# Patient Record
Sex: FEMALE | Race: WHITE | NOT HISPANIC OR LATINO
[De-identification: names, ages, dates, MRNs, and addresses within clinical notes are randomized per-mention and may not be internally consistent; named-entity substitution may affect disease eponyms.]

---

## 2020-11-14 ENCOUNTER — APPOINTMENT (OUTPATIENT)
Dept: ULTRASOUND IMAGING | Facility: CLINIC | Age: 65
End: 2020-11-14

## 2020-12-27 ENCOUNTER — TRANSCRIPTION ENCOUNTER (OUTPATIENT)
Age: 65
End: 2020-12-27

## 2021-04-19 ENCOUNTER — EMERGENCY (EMERGENCY)
Facility: HOSPITAL | Age: 66
LOS: 1 days | Discharge: HOME | End: 2021-04-19
Attending: EMERGENCY MEDICINE | Admitting: EMERGENCY MEDICINE
Payer: COMMERCIAL

## 2021-04-19 VITALS
RESPIRATION RATE: 18 BRPM | SYSTOLIC BLOOD PRESSURE: 105 MMHG | DIASTOLIC BLOOD PRESSURE: 65 MMHG | OXYGEN SATURATION: 92 % | HEART RATE: 85 BPM | TEMPERATURE: 98 F | HEIGHT: 66 IN

## 2021-04-19 VITALS
DIASTOLIC BLOOD PRESSURE: 92 MMHG | HEART RATE: 82 BPM | OXYGEN SATURATION: 94 % | RESPIRATION RATE: 17 BRPM | TEMPERATURE: 98 F | SYSTOLIC BLOOD PRESSURE: 107 MMHG

## 2021-04-19 PROCEDURE — 73562 X-RAY EXAM OF KNEE 3: CPT

## 2021-04-19 PROCEDURE — 96372 THER/PROPH/DIAG INJ SC/IM: CPT

## 2021-04-19 PROCEDURE — 99283 EMERGENCY DEPT VISIT LOW MDM: CPT | Mod: 25

## 2021-04-19 PROCEDURE — 99284 EMERGENCY DEPT VISIT MOD MDM: CPT

## 2021-04-19 PROCEDURE — 73562 X-RAY EXAM OF KNEE 3: CPT | Mod: 26,LT

## 2021-04-19 RX ORDER — KETOROLAC TROMETHAMINE 30 MG/ML
30 SYRINGE (ML) INJECTION ONCE
Refills: 0 | Status: DISCONTINUED | OUTPATIENT
Start: 2021-04-19 | End: 2021-04-19

## 2021-04-19 RX ORDER — OXYCODONE AND ACETAMINOPHEN 5; 325 MG/1; MG/1
2 TABLET ORAL ONCE
Refills: 0 | Status: DISCONTINUED | OUTPATIENT
Start: 2021-04-19 | End: 2021-04-19

## 2021-04-19 RX ORDER — METFORMIN HYDROCHLORIDE 850 MG/1
0 TABLET ORAL
Qty: 0 | Refills: 0 | DISCHARGE

## 2021-04-19 RX ORDER — OLMESARTAN MEDOXOMIL 5 MG/1
1 TABLET, FILM COATED ORAL
Qty: 0 | Refills: 0 | DISCHARGE

## 2021-04-19 RX ORDER — LEVOTHYROXINE SODIUM 125 MCG
1 TABLET ORAL
Qty: 0 | Refills: 0 | DISCHARGE

## 2021-04-19 RX ORDER — ERGOCALCIFEROL 1.25 MG/1
0 CAPSULE ORAL
Qty: 0 | Refills: 0 | DISCHARGE

## 2021-04-19 RX ORDER — DIAZEPAM 5 MG
5 TABLET ORAL ONCE
Refills: 0 | Status: DISCONTINUED | OUTPATIENT
Start: 2021-04-19 | End: 2021-04-19

## 2021-04-19 RX ADMIN — Medication 30 MILLIGRAM(S): at 20:40

## 2021-04-19 RX ADMIN — OXYCODONE AND ACETAMINOPHEN 2 TABLET(S): 5; 325 TABLET ORAL at 21:35

## 2021-04-19 RX ADMIN — Medication 5 MILLIGRAM(S): at 20:39

## 2021-04-19 NOTE — ED PROVIDER NOTE - PATIENT PORTAL LINK FT
You can access the FollowMyHealth Patient Portal offered by [facility] by registering at the following website: [email or web address]. By joining [employer]’s FollowMyHealth portal, you will also be able to view your health information using other applications (apps) compatible with our system.

## 2021-04-19 NOTE — ED PROVIDER NOTE - OBJECTIVE STATEMENT
64 y/o F with PMH of HTN and preDM on metformin presents to the ED with c/o left posterior knee pain x this afternoon. pt was sitting in her recliner, she went to put the leg lift part down and felt a tear followed by sudden pain in the knee. She denies direct trauma, paresthesia, leg swelling, f/c, or all other complaints

## 2021-04-19 NOTE — ED ADULT TRIAGE NOTE - CHIEF COMPLAINT QUOTE
pain to posterior L knee while getting up from chair   at rest, feels like pins and needles/ difficulty bearing weight

## 2021-04-19 NOTE — ED PROVIDER NOTE - CLINICAL SUMMARY MEDICAL DECISION MAKING FREE TEXT BOX
66 y/o F with PMH of HTN and preDM on metformin presents to the ED with c/o left posterior knee pain x this afternoon. pt was sitting in her recliner, she went to put the leg lift part down and felt a tear followed by sudden pain in the knee. She denies direct trauma, paresthesia, leg swelling, f/c, or all other complaints. PE as noted above. possible muscle spasm. XR images reviewed with Dr. Mayito SAINI. pain meds given in the ED. pt stable for dc with ortho f/u Jorge: 64 y/o F with PMH of HTN and preDM on metformin presents to the ED with c/o left posterior knee pain x this afternoon. pt was sitting in her recliner, she went to put the leg lift part down and felt a tear followed by sudden pain in the knee. She denies direct trauma, paresthesia, leg swelling, f/c, or all other complaints. PE as noted above. possible muscle spasm. XR images reviewed with Dr. Patel- YENY. pain meds given in the ED. pt stable for dc with ortho f/u

## 2021-04-19 NOTE — ED PROVIDER NOTE - CARE PROVIDERS DIRECT ADDRESSES
[email or web address]

## 2021-04-19 NOTE — ED PROVIDER NOTE - MUSCULOSKELETAL MINIMAL EXAM
+TTP over the left knee + mild TTP over the left knee posterior knee. FROM of knee. no obvious deformity. No calf TTP noted. no swelling on exam

## 2021-04-19 NOTE — ED PROVIDER NOTE - CARE PROVIDER_API CALL
Ted Austin)  Orthopaedic Sports Medicine; Orthopaedic Surgery  [address] [address]  Phone: [phone]  Fax: [phone]  Follow Up Time:

## 2021-04-19 NOTE — ED PROVIDER NOTE - NSFOLLOWUPINSTRUCTIONS_ED_ALL_ED_FT
Follow up with the orthopedic specialist within 2-3 days.     Rest, Ice 15 min on/ 15 min off, Elevate and keep compression of affected area.     Take the prescribed medication as directed. Keep your knee immobilizer in place.     Stay hydrated    Return to the ER if your symptoms worsen or for any other medical emergencies  *****************      Knee Pain    WHAT YOU NEED TO KNOW:    Knee pain may start suddenly, or it may be a long-term problem. You may have pain on the side, front, or back of your knee. You may have knee stiffness and swelling. You may hear popping sounds or feel like your knee is giving way or locking up as you walk. You may feel pain when you sit, stand, walk, or climb up and down stairs. Knee pain can be caused by conditions such as obesity, inflammation, or strains or tears in ligaments or tendons.     DISCHARGE INSTRUCTIONS:    Return to the emergency department if:   •Your pain is worse, even after treatment.       •You cannot bend or straighten your leg completely.       •The swelling around your knee does not go down even with treatment.      •Your knee is painful and hot to the touch.       Contact your healthcare provider if:   •You have questions or concerns about your condition or care.           Medicines: You may need any of the following:   •NSAIDs help decrease swelling and pain or fever. This medicine is available with or without a doctor's order. NSAIDs can cause stomach bleeding or kidney problems in certain people. If you take blood thinner medicine, always ask your healthcare provider if NSAIDs are safe for you. Always read the medicine label and follow directions.      •Acetaminophen decreases pain and fever. It is available without a doctor's order. Ask how much to take and how often to take it. Follow directions. Read the labels of all other medicines you are using to see if they also contain acetaminophen, or ask your doctor or pharmacist. Acetaminophen can cause liver damage if not taken correctly. Do not use more than 4 grams (4,000 milligrams) total of acetaminophen in one day.       •Prescription pain medicine may be given. Ask your healthcare provider how to take this medicine safely. Some prescription pain medicines contain acetaminophen. Do not take other medicines that contain acetaminophen without talking to your healthcare provider. Too much acetaminophen may cause liver damage. Prescription pain medicine may cause constipation. Ask your healthcare provider how to prevent or treat constipation.       •Take your medicine as directed. Contact your healthcare provider if you think your medicine is not helping or if you have side effects. Tell him or her if you are allergic to any medicine. Keep a list of the medicines, vitamins, and herbs you take. Include the amounts, and when and why you take them. Bring the list or the pill bottles to follow-up visits. Carry your medicine list with you in case of an emergency.      What you can do to manage your symptoms:   •Rest your knee so it can heal. Limit activities that increase your pain. Do low-impact exercises, such as walking or swimming.       •Apply ice to help reduce swelling and pain. Use an ice pack, or put crushed ice in a plastic bag. Cover it with a towel before you apply it to your knee. Apply ice for 15 to 20 minutes every hour, or as directed.      •Apply compression to help reduce swelling. Use a brace or bandage only as directed.      •Elevate your knee to help decrease pain and swelling. Elevate your knee while you are sitting or lying down. Prop your leg on pillows to keep your knee above the level of your heart.      •Prevent your knee from moving as directed. Your healthcare provider may put on a cast or splint. You may need to wear a leg brace to stabilize your knee. A leg brace can be adjusted to increase your range of motion as your knee heals.  Hinged Knee Braces            What you can do to prevent knee pain:   •Maintain a healthy weight. Extra weight increases your risk for knee pain. Ask your healthcare provider how much you should weigh. He or she can help you create a safe weight loss plan if you need to lose weight.      •Exercise or train properly. Use the correct equipment for sports. Wear shoes that provide good support. Check your posture often as you exercise, play sports, or train for an event. This can help prevent stress and strain on your knees. Rest between sessions so you do not overwork your knees.      Follow up with your healthcare provider within 24 hours or as directed: You may need follow-up treatments, such as steroid injections to decrease pain. Write down your questions so you remember to ask them during your visits.

## 2022-06-17 NOTE — ED ADULT NURSE NOTE - NS ED NOTE ABUSE SUSPICION NEGLECT YN
Cyclosporine Pregnancy And Lactation Text: This medication is Pregnancy Category C and it isn't know if it is safe during pregnancy. This medication is excreted in breast milk. No

## 2023-11-03 PROBLEM — I10 ESSENTIAL (PRIMARY) HYPERTENSION: Chronic | Status: ACTIVE | Noted: 2021-04-19

## 2023-11-03 PROBLEM — E03.9 HYPOTHYROIDISM, UNSPECIFIED: Chronic | Status: ACTIVE | Noted: 2021-04-19

## 2024-01-17 ENCOUNTER — NON-APPOINTMENT (OUTPATIENT)
Age: 69
End: 2024-01-17

## 2024-01-17 RX ORDER — MECLIZINE HYDROCHLORIDE 12.5 MG/1
12.5 TABLET ORAL DAILY
Refills: 0 | Status: ACTIVE | COMMUNITY

## 2024-01-17 RX ORDER — CLOTRIMAZOLE AND BETAMETHASONE DIPROPIONATE 10; .5 MG/G; MG/G
1-0.05 CREAM TOPICAL
Refills: 0 | Status: ACTIVE | COMMUNITY

## 2024-01-17 RX ORDER — ALPRAZOLAM 0.25 MG/1
0.25 TABLET ORAL
Refills: 0 | Status: ACTIVE | COMMUNITY

## 2024-02-05 ENCOUNTER — RX RENEWAL (OUTPATIENT)
Age: 69
End: 2024-02-05

## 2024-02-27 ENCOUNTER — APPOINTMENT (OUTPATIENT)
Dept: INTERNAL MEDICINE | Facility: CLINIC | Age: 69
End: 2024-02-27
Payer: MEDICARE

## 2024-02-27 VITALS
WEIGHT: 293 LBS | TEMPERATURE: 98.2 F | HEIGHT: 64 IN | OXYGEN SATURATION: 96 % | BODY MASS INDEX: 50.02 KG/M2 | SYSTOLIC BLOOD PRESSURE: 109 MMHG | HEART RATE: 88 BPM | DIASTOLIC BLOOD PRESSURE: 69 MMHG

## 2024-02-27 DIAGNOSIS — M17.12 UNILATERAL PRIMARY OSTEOARTHRITIS, LEFT KNEE: ICD-10-CM

## 2024-02-27 DIAGNOSIS — Z00.00 ENCOUNTER FOR GENERAL ADULT MEDICAL EXAMINATION W/OUT ABNORMAL FINDINGS: ICD-10-CM

## 2024-02-27 PROCEDURE — G0439: CPT

## 2024-02-27 RX ORDER — LEVOTHYROXINE SODIUM 88 UG/1
88 TABLET ORAL
Refills: 0 | Status: DISCONTINUED | COMMUNITY
End: 2024-02-27

## 2024-02-27 RX ORDER — LEVOTHYROXINE SODIUM 0.14 MG/1
137 TABLET ORAL
Qty: 30 | Refills: 1 | Status: ACTIVE | COMMUNITY
Start: 1900-01-01 | End: 1900-01-01

## 2024-02-27 RX ORDER — LEVOTHYROXINE SODIUM 0.12 MG/1
125 TABLET ORAL DAILY
Qty: 63 | Refills: 1 | Status: ACTIVE | COMMUNITY
Start: 1900-01-01 | End: 1900-01-01

## 2024-02-27 RX ORDER — ERGOCALCIFEROL 1.25 MG/1
1.25 MG CAPSULE, LIQUID FILLED ORAL
Refills: 0 | Status: DISCONTINUED | COMMUNITY
End: 2024-02-27

## 2024-02-27 RX ORDER — LEVOTHYROXINE SODIUM 100 UG/1
100 TABLET ORAL
Refills: 0 | Status: DISCONTINUED | COMMUNITY
End: 2024-02-27

## 2024-02-27 RX ORDER — METFORMIN HYDROCHLORIDE 500 MG/1
500 TABLET, COATED ORAL DAILY
Qty: 90 | Refills: 1 | Status: ACTIVE | COMMUNITY
Start: 1900-01-01 | End: 1900-01-01

## 2024-02-27 RX ORDER — OLMESARTAN MEDOXOMIL 20 MG/1
20 TABLET, FILM COATED ORAL
Qty: 90 | Refills: 1 | Status: ACTIVE | COMMUNITY
Start: 1900-01-01 | End: 1900-01-01

## 2024-02-27 NOTE — PHYSICAL EXAM
[Pedal Pulses Present] : the pedal pulses are present [No Varicosities] : no varicosities [Non Tender] : non-tender [Soft] : abdomen soft [Non-distended] : non-distended [Grossly Normal Strength/Tone] : grossly normal strength/tone [Coordination Grossly Intact] : coordination grossly intact [No Focal Deficits] : no focal deficits [Normal Gait] : normal gait [Normal] : affect was normal and insight and judgment were intact

## 2024-02-28 LAB
25(OH)D3 SERPL-MCNC: 37.4 NG/ML
ALBUMIN SERPL ELPH-MCNC: 4.4 G/DL
ALP BLD-CCNC: 119 U/L
ALT SERPL-CCNC: 17 U/L
ANION GAP SERPL CALC-SCNC: 13 MMOL/L
APPEARANCE: CLEAR
AST SERPL-CCNC: 15 U/L
BASOPHILS # BLD AUTO: 0.05 K/UL
BASOPHILS NFR BLD AUTO: 0.9 %
BILIRUB SERPL-MCNC: 0.5 MG/DL
BILIRUBIN URINE: NEGATIVE
BLOOD URINE: NEGATIVE
BUN SERPL-MCNC: 23 MG/DL
CALCIUM SERPL-MCNC: 9.9 MG/DL
CHLORIDE SERPL-SCNC: 103 MMOL/L
CHOLEST SERPL-MCNC: 191 MG/DL
CK SERPL-CCNC: 39 U/L
CO2 SERPL-SCNC: 25 MMOL/L
COLOR: YELLOW
CREAT SERPL-MCNC: 1.2 MG/DL
CREAT SPEC-SCNC: 123 MG/DL
CRP SERPL-MCNC: 5 MG/L
EGFR: 50 ML/MIN/1.73M2
EOSINOPHIL # BLD AUTO: 0.15 K/UL
EOSINOPHIL NFR BLD AUTO: 2.6 %
ERYTHROCYTE [SEDIMENTATION RATE] IN BLOOD BY WESTERGREN METHOD: 18 MM/HR
ESTIMATED AVERAGE GLUCOSE: 140 MG/DL
FERRITIN SERPL-MCNC: 111 NG/ML
FOLATE SERPL-MCNC: 17.9 NG/ML
GLUCOSE QUALITATIVE U: NEGATIVE MG/DL
GLUCOSE SERPL-MCNC: 108 MG/DL
HBA1C MFR BLD HPLC: 6.5 %
HCT VFR BLD CALC: 39 %
HDLC SERPL-MCNC: 49 MG/DL
HGB BLD-MCNC: 12.5 G/DL
IMM GRANULOCYTES NFR BLD AUTO: 0.2 %
IRON SATN MFR SERPL: 18 %
IRON SERPL-MCNC: 66 UG/DL
KETONES URINE: NEGATIVE MG/DL
LDLC SERPL CALC-MCNC: 107 MG/DL
LEUKOCYTE ESTERASE URINE: NEGATIVE
LYMPHOCYTES # BLD AUTO: 1.64 K/UL
LYMPHOCYTES NFR BLD AUTO: 27.9 %
MAGNESIUM SERPL-MCNC: 1.5 MG/DL
MAN DIFF?: NORMAL
MCHC RBC-ENTMCNC: 28.2 PG
MCHC RBC-ENTMCNC: 32.1 GM/DL
MCV RBC AUTO: 88 FL
MICROALBUMIN 24H UR DL<=1MG/L-MCNC: <1.2 MG/DL
MICROALBUMIN/CREAT 24H UR-RTO: NORMAL MG/G
MONOCYTES # BLD AUTO: 0.38 K/UL
MONOCYTES NFR BLD AUTO: 6.5 %
NEUTROPHILS # BLD AUTO: 3.64 K/UL
NEUTROPHILS NFR BLD AUTO: 61.9 %
NITRITE URINE: NEGATIVE
NONHDLC SERPL-MCNC: 141 MG/DL
PH URINE: 5.5
PLATELET # BLD AUTO: 231 K/UL
POTASSIUM SERPL-SCNC: 4.3 MMOL/L
PROT SERPL-MCNC: 7.2 G/DL
PROTEIN URINE: NEGATIVE MG/DL
RBC # BLD: 4.43 M/UL
RBC # FLD: 13.3 %
SODIUM SERPL-SCNC: 141 MMOL/L
SPECIFIC GRAVITY URINE: 1.02
T3 SERPL-MCNC: 89 NG/DL
T4 FREE SERPL-MCNC: 1.7 NG/DL
TIBC SERPL-MCNC: 368 UG/DL
TRIGL SERPL-MCNC: 196 MG/DL
TSH SERPL-ACNC: 1.77 UIU/ML
UIBC SERPL-MCNC: 302 UG/DL
UROBILINOGEN URINE: 0.2 MG/DL
VIT B12 SERPL-MCNC: 520 PG/ML
WBC # FLD AUTO: 5.87 K/UL

## 2024-03-04 ENCOUNTER — NON-APPOINTMENT (OUTPATIENT)
Age: 69
End: 2024-03-04

## 2024-03-04 RX ORDER — OLMESARTAN MEDOXOMIL 20 MG/1
20 TABLET, FILM COATED ORAL
Qty: 90 | Refills: 3 | Status: ACTIVE | COMMUNITY
Start: 2024-03-04 | End: 1900-01-01

## 2024-03-16 ENCOUNTER — NON-APPOINTMENT (OUTPATIENT)
Age: 69
End: 2024-03-16

## 2024-03-23 ENCOUNTER — NON-APPOINTMENT (OUTPATIENT)
Age: 69
End: 2024-03-23

## 2024-06-01 ENCOUNTER — APPOINTMENT (OUTPATIENT)
Dept: INTERNAL MEDICINE | Facility: CLINIC | Age: 69
End: 2024-06-01
Payer: MEDICARE

## 2024-06-01 VITALS
WEIGHT: 293 LBS | OXYGEN SATURATION: 94 % | SYSTOLIC BLOOD PRESSURE: 116 MMHG | HEART RATE: 97 BPM | BODY MASS INDEX: 50.02 KG/M2 | HEIGHT: 64 IN | DIASTOLIC BLOOD PRESSURE: 75 MMHG

## 2024-06-01 DIAGNOSIS — E03.9 HYPOTHYROIDISM, UNSPECIFIED: ICD-10-CM

## 2024-06-01 DIAGNOSIS — N18.31 CHRONIC KIDNEY DISEASE, STAGE 3A: ICD-10-CM

## 2024-06-01 DIAGNOSIS — K21.9 GASTRO-ESOPHAGEAL REFLUX DISEASE W/OUT ESOPHAGITIS: ICD-10-CM

## 2024-06-01 DIAGNOSIS — E11.9 TYPE 2 DIABETES MELLITUS W/OUT COMPLICATIONS: ICD-10-CM

## 2024-06-01 DIAGNOSIS — I10 ESSENTIAL (PRIMARY) HYPERTENSION: ICD-10-CM

## 2024-06-01 DIAGNOSIS — M54.9 DORSALGIA, UNSPECIFIED: ICD-10-CM

## 2024-06-01 DIAGNOSIS — E66.01 MORBID (SEVERE) OBESITY DUE TO EXCESS CALORIES: ICD-10-CM

## 2024-06-01 PROCEDURE — 99213 OFFICE O/P EST LOW 20 MIN: CPT

## 2024-06-01 PROCEDURE — G2211 COMPLEX E/M VISIT ADD ON: CPT

## 2024-06-01 RX ORDER — FAMOTIDINE 40 MG/1
40 TABLET, FILM COATED ORAL DAILY
Qty: 90 | Refills: 0 | Status: ACTIVE | COMMUNITY
Start: 2024-06-01 | End: 1900-01-01

## 2024-06-01 RX ORDER — SEMAGLUTIDE 0.68 MG/ML
2 INJECTION, SOLUTION SUBCUTANEOUS
Qty: 1 | Refills: 1 | Status: ACTIVE | COMMUNITY
Start: 2024-06-01 | End: 1900-01-01

## 2024-06-02 PROBLEM — E03.9 HYPOTHYROIDISM: Status: ACTIVE | Noted: 2024-02-27

## 2024-06-02 PROBLEM — E11.9 DIABETES: Status: ACTIVE | Noted: 2024-02-06

## 2024-06-02 PROBLEM — N18.31 CHRONIC RENAL FAILURE, STAGE 3A: Status: ACTIVE | Noted: 2024-02-27

## 2024-06-02 PROBLEM — I10 HTN (HYPERTENSION): Status: ACTIVE | Noted: 2024-02-06

## 2024-06-02 PROBLEM — M54.9 UPPER BACK PAIN: Status: ACTIVE | Noted: 2024-02-27

## 2024-06-02 PROBLEM — E66.01 MORBID OBESITY: Status: ACTIVE | Noted: 2024-02-27

## 2024-06-02 NOTE — HISTORY OF PRESENT ILLNESS
[FreeTextEntry1] : Follow-up visit [de-identified] : 68 yrs old female here to discuss recent breast surgery visit for breast reduction surgery. Patient was referred here for weight loss medication to be started.  The surgeon wishes patient to lose weight prior to surgery to lower risk of morbidity.   Patient has a history of morbid obesity and DMII and would be a good candidate for Ozempic and is interested in following a strict low cholesterol, low fat, low carb and weight reduction diet along with Ozempic.  Also, pt does have some GI SEs of loose stools secondary to Metformin.  Will start weaning off Metformin and start Ozempic. Patient also plans to increase cardio- mainly walking. Discussed side effects of Ozempic and precautions while taking this medication.  Discussed GERD, constipation, nausea, vomiting as common side effects. Acute pancreatitis can be a serious but less common side effect.  Rare side effect- thyroid cancer also discussed. Diet and exercise as above- counseling done Start lowest dosage -.25 mg weekly for 6 weeks D/W patient how to take and how to administer Pt to contact me in 4 weeks with weight readings and dietary changes. Patient understands all instructions and agrees with plan. Patient had a baseline labwork done 2/27/24. Will repeat labwork in approximately 3 mos or sooner if patient is not feeling well. Patient advised to contact me if not feeling well, has any abdominal pain or any severe symptoms. RV 3 mos Patient understands instructions and agrees with plan

## 2024-08-03 ENCOUNTER — LABORATORY RESULT (OUTPATIENT)
Age: 69
End: 2024-08-03

## 2024-08-03 VITALS — WEIGHT: 280 LBS | BODY MASS INDEX: 48.06 KG/M2

## 2024-08-03 RX ORDER — SEMAGLUTIDE 0.68 MG/ML
2 INJECTION, SOLUTION SUBCUTANEOUS
Qty: 1 | Refills: 2 | Status: ACTIVE | COMMUNITY
Start: 2024-08-03 | End: 1900-01-01

## 2024-08-05 LAB
25(OH)D3 SERPL-MCNC: 40.9 NG/ML
ALBUMIN SERPL ELPH-MCNC: 4.8 G/DL
ALP BLD-CCNC: 128 U/L
ALT SERPL-CCNC: 28 U/L
AMYLASE/CREAT SERPL: 51 U/L
ANION GAP SERPL CALC-SCNC: 13 MMOL/L
APPEARANCE: CLEAR
AST SERPL-CCNC: 20 U/L
BASOPHILS # BLD AUTO: 0.06 K/UL
BASOPHILS NFR BLD AUTO: 1.1 %
BILIRUB SERPL-MCNC: 0.4 MG/DL
BILIRUBIN URINE: NEGATIVE
BLOOD URINE: NEGATIVE
BUN SERPL-MCNC: 23 MG/DL
CALCIUM SERPL-MCNC: 10.3 MG/DL
CHLORIDE SERPL-SCNC: 103 MMOL/L
CHOLEST SERPL-MCNC: 201 MG/DL
CK SERPL-CCNC: 37 U/L
CO2 SERPL-SCNC: 26 MMOL/L
COLOR: YELLOW
CREAT SERPL-MCNC: 1.42 MG/DL
CREAT SPEC-SCNC: 220 MG/DL
CRP SERPL-MCNC: <3 MG/L
EGFR: 41 ML/MIN/1.73M2
EOSINOPHIL # BLD AUTO: 0.12 K/UL
EOSINOPHIL NFR BLD AUTO: 2.2 %
ERYTHROCYTE [SEDIMENTATION RATE] IN BLOOD BY WESTERGREN METHOD: 22 MM/HR
ESTIMATED AVERAGE GLUCOSE: 123 MG/DL
FERRITIN SERPL-MCNC: 162 NG/ML
FOLATE SERPL-MCNC: >20 NG/ML
GLUCOSE QUALITATIVE U: NEGATIVE MG/DL
GLUCOSE SERPL-MCNC: 116 MG/DL
HBA1C MFR BLD HPLC: 5.9 %
HCT VFR BLD CALC: 41.2 %
HDLC SERPL-MCNC: 46 MG/DL
HGB BLD-MCNC: 13 G/DL
IMM GRANULOCYTES NFR BLD AUTO: 0.2 %
IRON SATN MFR SERPL: 18 %
IRON SERPL-MCNC: 63 UG/DL
KETONES URINE: NEGATIVE MG/DL
LDLC SERPL CALC-MCNC: 122 MG/DL
LEUKOCYTE ESTERASE URINE: ABNORMAL
LPL SERPL-CCNC: 46 U/L
LYMPHOCYTES # BLD AUTO: 1.48 K/UL
LYMPHOCYTES NFR BLD AUTO: 26.9 %
MAGNESIUM SERPL-MCNC: 1.7 MG/DL
MAN DIFF?: NORMAL
MCHC RBC-ENTMCNC: 27.8 PG
MCHC RBC-ENTMCNC: 31.6 GM/DL
MCV RBC AUTO: 88 FL
MICROALBUMIN 24H UR DL<=1MG/L-MCNC: <1.2 MG/DL
MICROALBUMIN/CREAT 24H UR-RTO: NORMAL MG/G
MONOCYTES # BLD AUTO: 0.3 K/UL
MONOCYTES NFR BLD AUTO: 5.5 %
NEUTROPHILS # BLD AUTO: 3.53 K/UL
NEUTROPHILS NFR BLD AUTO: 64.1 %
NITRITE URINE: NEGATIVE
NONHDLC SERPL-MCNC: 155 MG/DL
PH URINE: 5.5
PLATELET # BLD AUTO: 264 K/UL
POTASSIUM SERPL-SCNC: 4.8 MMOL/L
PROT SERPL-MCNC: 7.4 G/DL
PROTEIN URINE: NORMAL MG/DL
RBC # BLD: 4.68 M/UL
RBC # FLD: 13.3 %
SODIUM SERPL-SCNC: 142 MMOL/L
SPECIFIC GRAVITY URINE: 1.03
T4 FREE SERPL-MCNC: 1.5 NG/DL
TIBC SERPL-MCNC: 356 UG/DL
TRIGL SERPL-MCNC: 186 MG/DL
TSH SERPL-ACNC: 2.04 UIU/ML
UIBC SERPL-MCNC: 293 UG/DL
UROBILINOGEN URINE: 0.2 MG/DL
VIT B12 SERPL-MCNC: 891 PG/ML
WBC # FLD AUTO: 5.5 K/UL

## 2024-08-08 ENCOUNTER — NON-APPOINTMENT (OUTPATIENT)
Age: 69
End: 2024-08-08

## 2024-08-09 PROBLEM — N39.0 UTI (URINARY TRACT INFECTION): Status: ACTIVE | Noted: 2024-08-09 | Resolved: 2024-09-08

## 2024-08-10 ENCOUNTER — RX RENEWAL (OUTPATIENT)
Age: 69
End: 2024-08-10

## 2024-08-15 ENCOUNTER — APPOINTMENT (OUTPATIENT)
Dept: INTERNAL MEDICINE | Facility: CLINIC | Age: 69
End: 2024-08-15
Payer: MEDICARE

## 2024-08-15 ENCOUNTER — APPOINTMENT (OUTPATIENT)
Dept: INTERNAL MEDICINE | Facility: CLINIC | Age: 69
End: 2024-08-15

## 2024-08-15 VITALS
HEART RATE: 94 BPM | SYSTOLIC BLOOD PRESSURE: 102 MMHG | HEIGHT: 64 IN | DIASTOLIC BLOOD PRESSURE: 69 MMHG | WEIGHT: 281 LBS | BODY MASS INDEX: 47.97 KG/M2 | OXYGEN SATURATION: 97 %

## 2024-08-15 DIAGNOSIS — H35.9 UNSPECIFIED RETINAL DISORDER: ICD-10-CM

## 2024-08-15 DIAGNOSIS — N39.0 URINARY TRACT INFECTION, SITE NOT SPECIFIED: ICD-10-CM

## 2024-08-15 DIAGNOSIS — E66.01 MORBID (SEVERE) OBESITY DUE TO EXCESS CALORIES: ICD-10-CM

## 2024-08-15 DIAGNOSIS — E03.9 HYPOTHYROIDISM, UNSPECIFIED: ICD-10-CM

## 2024-08-15 DIAGNOSIS — E11.9 TYPE 2 DIABETES MELLITUS W/OUT COMPLICATIONS: ICD-10-CM

## 2024-08-15 DIAGNOSIS — N18.32 CHRONIC KIDNEY DISEASE, STAGE 3B: ICD-10-CM

## 2024-08-15 DIAGNOSIS — I10 ESSENTIAL (PRIMARY) HYPERTENSION: ICD-10-CM

## 2024-08-15 DIAGNOSIS — M54.9 DORSALGIA, UNSPECIFIED: ICD-10-CM

## 2024-08-15 PROCEDURE — 99213 OFFICE O/P EST LOW 20 MIN: CPT

## 2024-08-15 PROCEDURE — G2211 COMPLEX E/M VISIT ADD ON: CPT

## 2024-08-15 NOTE — HISTORY OF PRESENT ILLNESS
[FreeTextEntry1] : Follow-up visit [de-identified] : 68 yrs old female with history of DMII, morbid obesity, HTN, hypothyroidism, retinal disease, CRF, anxiety, here for follow-up visit. She is currently on Ozempic and is doing well, lost 16 lbs since 2024. Currently on 0.5 mg weekly with only occasional bloating if overeats.  Denies nausea, vomiting, abdominal pain.  Main complaint is continued blurred vision for over 6 mos due to retinal disease. Sees ophthalmology- every 3 mos.   Labwork - just done was discussed with pt- she has UTI and is under antibiotic treatment with Cephalexin, PO fluids, cranberry/lemon. Last hbA1c 5.9 and  - goal under 100- improving. Alk phos mild elevation- will watch Eating more healthy - high protein/low carb diet- a lot of chicken and vegetable, fruits, cheerios, nuts and less pasta, bread. Exercise: Walking Medication changes: Off Metformin X 2 mos. Ozempic 0,5 mg weekly Alive, Magnesium, Turmeric, VItamin D, B12, Eye vitamins BID as per ophthalmology NKDA SH: Nonsmoker, ETOH social, drugs-none FH: 2 sisters ()  breast cancer. GM  85 breast cancer. Mother lung cancer 70s . Father unknown

## 2024-08-15 NOTE — PHYSICAL EXAM
[Normal Sclera/Conjunctiva] : normal sclera/conjunctiva [PERRL] : pupils equal round and reactive to light [EOMI] : extraocular movements intact [Normal] : normal rate, regular rhythm, normal S1 and S2 and no murmur heard [Pedal Pulses Present] : the pedal pulses are present [No Edema] : there was no peripheral edema [Soft] : abdomen soft [Non Tender] : non-tender [Non-distended] : non-distended [No CVA Tenderness] : no CVA  tenderness [de-identified] : /69  BMI; 48.23  [de-identified] : Decreased vision- sees retinal specialist

## 2024-08-15 NOTE — HISTORY OF PRESENT ILLNESS
[FreeTextEntry1] : Follow-up visit [de-identified] : 68 yrs old female with history of DMII, morbid obesity, HTN, hypothyroidism, retinal disease, CRF, anxiety, here for follow-up visit. She is currently on Ozempic and is doing well, lost 16 lbs since 2024. Currently on 0.5 mg weekly with only occasional bloating if overeats.  Denies nausea, vomiting, abdominal pain.  Main complaint is continued blurred vision for over 6 mos due to retinal disease. Sees ophthalmology- every 3 mos.   Labwork - just done was discussed with pt- she has UTI and is under antibiotic treatment with Cephalexin, PO fluids, cranberry/lemon. Last hbA1c 5.9 and  - goal under 100- improving. Alk phos mild elevation- will watch Eating more healthy - high protein/low carb diet- a lot of chicken and vegetable, fruits, cheerios, nuts and less pasta, bread. Exercise: Walking Medication changes: Off Metformin X 2 mos. Ozempic 0,5 mg weekly Alive, Magnesium, Turmeric, VItamin D, B12, Eye vitamins BID as per ophthalmology NKDA SH: Nonsmoker, ETOH social, drugs-none FH: 2 sisters ()  breast cancer. GM  85 breast cancer. Mother lung cancer 70s . Father unknown

## 2024-08-15 NOTE — PLAN
[FreeTextEntry1] : 68 years old female here for follow-up visit.   Recent lab work shows better diabetic control on Ozempic with hbA1c 5.9- off Metformin which caused diarrhea. Also losing weight.  LDL is 122 with goal under 100. She sees retinal specialist every 3 mos without significant change in condition. Patient changed her diet and is eating low carb, low fat diet, and walking more. Medication reconciliation done.  Patient wishes to continue Ozempic at the current dosage and continue diet/exercise plan. Patient will complete Cephalexin for UTI- and repeat urine screen when she completes antibiotics. Patient will monitor weight. RV 3 mos Patient understands instructions and agrees with plan. Continue close ophthalmology followup.

## 2024-08-15 NOTE — REVIEW OF SYSTEMS
[Negative] : Heme/Lymph [FreeTextEntry2] : Lost 16 lbs since 2/27/24 [FreeTextEntry3] : Decreased vision right greater than left-retinal disease [FreeTextEntry1] : Heavy breasts- adds extra weight and upper pain in neck/shoulders

## 2024-08-15 NOTE — PHYSICAL EXAM
[Normal Sclera/Conjunctiva] : normal sclera/conjunctiva [PERRL] : pupils equal round and reactive to light [EOMI] : extraocular movements intact [Normal] : normal rate, regular rhythm, normal S1 and S2 and no murmur heard [Pedal Pulses Present] : the pedal pulses are present [No Edema] : there was no peripheral edema [Soft] : abdomen soft [Non Tender] : non-tender [Non-distended] : non-distended [No CVA Tenderness] : no CVA  tenderness [de-identified] : /69  BMI; 48.23  [de-identified] : Decreased vision- sees retinal specialist

## 2024-08-27 ENCOUNTER — RX RENEWAL (OUTPATIENT)
Age: 69
End: 2024-08-27

## 2024-08-27 ENCOUNTER — NON-APPOINTMENT (OUTPATIENT)
Age: 69
End: 2024-08-27

## 2024-08-28 ENCOUNTER — RX RENEWAL (OUTPATIENT)
Age: 69
End: 2024-08-28

## 2024-10-04 ENCOUNTER — RX RENEWAL (OUTPATIENT)
Age: 69
End: 2024-10-04

## 2024-10-04 RX ORDER — LEVOTHYROXINE SODIUM 0.14 MG/1
137 TABLET ORAL
Qty: 30 | Refills: 0 | Status: ACTIVE | COMMUNITY
Start: 2024-10-04 | End: 1900-01-01

## 2024-10-04 RX ORDER — LEVOTHYROXINE SODIUM 0.12 MG/1
125 TABLET ORAL
Qty: 63 | Refills: 0 | Status: ACTIVE | COMMUNITY
Start: 2024-10-04 | End: 1900-01-01

## 2024-10-10 ENCOUNTER — APPOINTMENT (OUTPATIENT)
Dept: CARDIOLOGY | Facility: CLINIC | Age: 69
End: 2024-10-10

## 2024-11-05 ENCOUNTER — APPOINTMENT (OUTPATIENT)
Dept: INTERNAL MEDICINE | Facility: CLINIC | Age: 69
End: 2024-11-05
Payer: MEDICARE

## 2024-11-05 VITALS
DIASTOLIC BLOOD PRESSURE: 71 MMHG | BODY MASS INDEX: 46.61 KG/M2 | OXYGEN SATURATION: 98 % | HEIGHT: 64 IN | SYSTOLIC BLOOD PRESSURE: 106 MMHG | HEART RATE: 97 BPM | WEIGHT: 273 LBS

## 2024-11-05 DIAGNOSIS — N18.32 CHRONIC KIDNEY DISEASE, STAGE 3B: ICD-10-CM

## 2024-11-05 DIAGNOSIS — K21.9 GASTRO-ESOPHAGEAL REFLUX DISEASE W/OUT ESOPHAGITIS: ICD-10-CM

## 2024-11-05 DIAGNOSIS — E03.9 HYPOTHYROIDISM, UNSPECIFIED: ICD-10-CM

## 2024-11-05 DIAGNOSIS — E11.9 TYPE 2 DIABETES MELLITUS W/OUT COMPLICATIONS: ICD-10-CM

## 2024-11-05 DIAGNOSIS — I10 ESSENTIAL (PRIMARY) HYPERTENSION: ICD-10-CM

## 2024-11-05 DIAGNOSIS — H35.9 UNSPECIFIED RETINAL DISORDER: ICD-10-CM

## 2024-11-05 DIAGNOSIS — E66.01 MORBID (SEVERE) OBESITY DUE TO EXCESS CALORIES: ICD-10-CM

## 2024-11-05 DIAGNOSIS — M54.9 DORSALGIA, UNSPECIFIED: ICD-10-CM

## 2024-11-05 PROCEDURE — 99213 OFFICE O/P EST LOW 20 MIN: CPT

## 2024-11-05 PROCEDURE — G2211 COMPLEX E/M VISIT ADD ON: CPT

## 2024-11-05 NOTE — PHYSICAL EXAM
[Normal Sclera/Conjunctiva] : normal sclera/conjunctiva [PERRL] : pupils equal round and reactive to light [EOMI] : extraocular movements intact [Normal] : normal rate, regular rhythm, normal S1 and S2 and no murmur heard [No Edema] : there was no peripheral edema [Soft] : abdomen soft [Non Tender] : non-tender [Non-distended] : non-distended [de-identified] : BP normotensive   BMI: 46.86

## 2024-11-05 NOTE — HISTORY OF PRESENT ILLNESS
[FreeTextEntry1] : Follow-up visit [de-identified] : 69 yrs old female with history of DM2, morbid obesity, hypertension, hypothyroidism, retinal disease, CRF, anxiety here for followup. Patient is taking Ozempic for diabetes- has significant macular disease and last HbA1c was 5.9 and last .  Sees ophthalmology every 3 months.  Patient has continued blurred vision right greater than left due to retinal disease.  Patient is taking Ozmepic 0.5 mg weekly No significant side effects.  Does not wish to increase the dose presently. Patient lost about 30 lbs since 2024. Patient lost 8 lbs since 8/15/24. Does not want to increase dosage at this time- states has been to a few parties this month and will work harder on diet. Appetite is decreased but no significant nausea or vomiting.   Noted to have elevated Alk phos 2024 bloodwork and prior UTI 2024 Ecoli- treated with Cephalexin- needs repeat now. S/P cholecystectomy NKDA Medication: no changes since prior visit- reconciliation done SH: Nonsmoker, ETOH social, drugs-none FH: 2 sisters ()  breast cancer. GM  85 breast cancer. Mother lung cancer 70s . Father unknown States has occasional urinary discomfort but no burning. Occasional itching vaginal. No CP/SOB or palpitations, no dizziness, headaches. No abdominal pain or pelvic pain. No BPR/melena Seeing breast specialist for possible breast reduction surgery-upcoming due to back pain and shoulder pains.

## 2024-11-05 NOTE — PLAN
[FreeTextEntry1] : 69 years old female here for follow-up visit.  Patient is taking Ozempic for diabetes. Her last A1c 5.9 8/2024 and . Patient has lost 30 lbs through medication, diet and exercise. No abdominal pain or any major side effects from medication.  Medication reconciliation done.  Diet and exercise discussed at length.  Patient plans to be more focused and follow a stricter diet and exercise plan. Her retinal disease is stable with possibly some improvement noticed left eye. She is continuing to see ophthalmology every 3 months. Lab work to today: A1c, lipid, TFTs, LFTs CK, CBC, CMP, magnesium, amylase/lipase, urine screen including microalbumin, renal testing.  Prior GFR was 41 down from 50.  However, last appointment patient had UTI.  Will recheck now.  If patient remains at Stage 3B, will refer to nephrologist.  Increase p.o. hydration. Patient specific education provided Preventive screening discussed with patient who states she is currently up-to-date with her screening. States had recent mammogram and sonogram done by breast specialist and was okay. CRC screening is due 2025 RV 3 months patient understands instructions and agrees with plan Will contact patient with results of all testing

## 2024-11-06 ENCOUNTER — LABORATORY RESULT (OUTPATIENT)
Age: 69
End: 2024-11-06

## 2024-11-07 LAB
ALBUMIN SERPL ELPH-MCNC: 4.4 G/DL
ALP BLD-CCNC: 125 U/L
ALT SERPL-CCNC: 17 U/L
AMYLASE/CREAT SERPL: 54 U/L
ANION GAP SERPL CALC-SCNC: 14 MMOL/L
APPEARANCE: CLEAR
AST SERPL-CCNC: 15 U/L
BASOPHILS # BLD AUTO: 0.05 K/UL
BASOPHILS NFR BLD AUTO: 0.8 %
BILIRUB SERPL-MCNC: 0.6 MG/DL
BILIRUBIN URINE: NEGATIVE
BLOOD URINE: NEGATIVE
BUN SERPL-MCNC: 20 MG/DL
CALCIUM SERPL-MCNC: 10 MG/DL
CHLORIDE SERPL-SCNC: 103 MMOL/L
CHOLEST SERPL-MCNC: 201 MG/DL
CK SERPL-CCNC: 36 U/L
CO2 SERPL-SCNC: 26 MMOL/L
COLOR: YELLOW
CREAT SERPL-MCNC: 1.31 MG/DL
CREAT SPEC-SCNC: 52 MG/DL
CRP SERPL-MCNC: 3 MG/L
EGFR: 44 ML/MIN/1.73M2
EOSINOPHIL # BLD AUTO: 0.15 K/UL
EOSINOPHIL NFR BLD AUTO: 2.3 %
ERYTHROCYTE [SEDIMENTATION RATE] IN BLOOD BY WESTERGREN METHOD: 13 MM/HR
ESTIMATED AVERAGE GLUCOSE: 114 MG/DL
FOLATE SERPL-MCNC: >20 NG/ML
GGT SERPL-CCNC: 17 U/L
GLUCOSE QUALITATIVE U: NEGATIVE MG/DL
GLUCOSE SERPL-MCNC: 100 MG/DL
HBA1C MFR BLD HPLC: 5.6 %
HCT VFR BLD CALC: 41.3 %
HDLC SERPL-MCNC: 43 MG/DL
HGB BLD-MCNC: 12.9 G/DL
IMM GRANULOCYTES NFR BLD AUTO: 0.3 %
KETONES URINE: NEGATIVE MG/DL
LDLC SERPL CALC-MCNC: 119 MG/DL
LEUKOCYTE ESTERASE URINE: ABNORMAL
LPL SERPL-CCNC: 43 U/L
LYMPHOCYTES # BLD AUTO: 1.73 K/UL
LYMPHOCYTES NFR BLD AUTO: 26.5 %
MAGNESIUM SERPL-MCNC: 1.6 MG/DL
MAN DIFF?: NORMAL
MCHC RBC-ENTMCNC: 28.1 PG
MCHC RBC-ENTMCNC: 31.2 G/DL
MCV RBC AUTO: 90 FL
MICROALBUMIN 24H UR DL<=1MG/L-MCNC: <1.2 MG/DL
MICROALBUMIN/CREAT 24H UR-RTO: NORMAL MG/G
MONOCYTES # BLD AUTO: 0.4 K/UL
MONOCYTES NFR BLD AUTO: 6.1 %
NEUTROPHILS # BLD AUTO: 4.19 K/UL
NEUTROPHILS NFR BLD AUTO: 64 %
NITRITE URINE: NEGATIVE
NONHDLC SERPL-MCNC: 158 MG/DL
PH URINE: 6
PLATELET # BLD AUTO: 258 K/UL
POTASSIUM SERPL-SCNC: 4 MMOL/L
PROT SERPL-MCNC: 7.1 G/DL
PROTEIN URINE: NEGATIVE MG/DL
RBC # BLD: 4.59 M/UL
RBC # FLD: 13 %
SODIUM SERPL-SCNC: 143 MMOL/L
SPECIFIC GRAVITY URINE: 1.01
T3 SERPL-MCNC: 94 NG/DL
T4 FREE SERPL-MCNC: 1.7 NG/DL
TRIGL SERPL-MCNC: 220 MG/DL
TSH SERPL-ACNC: 2.35 UIU/ML
UROBILINOGEN URINE: 0.2 MG/DL
WBC # FLD AUTO: 6.54 K/UL

## 2024-11-12 DIAGNOSIS — N39.0 URINARY TRACT INFECTION, SITE NOT SPECIFIED: ICD-10-CM

## 2024-11-23 ENCOUNTER — NON-APPOINTMENT (OUTPATIENT)
Age: 69
End: 2024-11-23

## 2024-11-23 LAB
APPEARANCE: CLEAR
BACTERIA UR CULT: NORMAL
BACTERIA: NEGATIVE /HPF
BILIRUBIN URINE: NEGATIVE
BLOOD URINE: NEGATIVE
CAST: 0 /LPF
COLOR: YELLOW
EPITHELIAL CELLS: 3 /HPF
GLUCOSE QUALITATIVE U: NEGATIVE MG/DL
KETONES URINE: NEGATIVE MG/DL
LEUKOCYTE ESTERASE URINE: ABNORMAL
MICROSCOPIC-UA: NORMAL
NITRITE URINE: NEGATIVE
PH URINE: 5.5
PROTEIN URINE: NEGATIVE MG/DL
RED BLOOD CELLS URINE: 1 /HPF
REVIEW: NORMAL
SPECIFIC GRAVITY URINE: 1.02
UROBILINOGEN URINE: 0.2 MG/DL
WHITE BLOOD CELLS URINE: 0 /HPF

## 2024-12-10 ENCOUNTER — NON-APPOINTMENT (OUTPATIENT)
Age: 69
End: 2024-12-10

## 2025-01-11 ENCOUNTER — RX RENEWAL (OUTPATIENT)
Age: 70
End: 2025-01-11

## 2025-02-11 ENCOUNTER — RX RENEWAL (OUTPATIENT)
Age: 70
End: 2025-02-11

## 2025-03-04 ENCOUNTER — APPOINTMENT (OUTPATIENT)
Dept: INTERNAL MEDICINE | Facility: CLINIC | Age: 70
End: 2025-03-04

## 2025-03-11 ENCOUNTER — RX RENEWAL (OUTPATIENT)
Age: 70
End: 2025-03-11

## 2025-03-18 ENCOUNTER — APPOINTMENT (OUTPATIENT)
Dept: INTERNAL MEDICINE | Facility: CLINIC | Age: 70
End: 2025-03-18

## 2025-04-22 ENCOUNTER — NON-APPOINTMENT (OUTPATIENT)
Age: 70
End: 2025-04-22

## 2025-04-22 ENCOUNTER — APPOINTMENT (OUTPATIENT)
Dept: INTERNAL MEDICINE | Facility: CLINIC | Age: 70
End: 2025-04-22
Payer: MEDICARE

## 2025-04-22 VITALS
HEIGHT: 64 IN | HEART RATE: 93 BPM | SYSTOLIC BLOOD PRESSURE: 103 MMHG | BODY MASS INDEX: 48.01 KG/M2 | OXYGEN SATURATION: 96 % | DIASTOLIC BLOOD PRESSURE: 68 MMHG | WEIGHT: 281.25 LBS

## 2025-04-22 DIAGNOSIS — K21.9 GASTRO-ESOPHAGEAL REFLUX DISEASE W/OUT ESOPHAGITIS: ICD-10-CM

## 2025-04-22 DIAGNOSIS — Z00.00 ENCOUNTER FOR GENERAL ADULT MEDICAL EXAMINATION W/OUT ABNORMAL FINDINGS: ICD-10-CM

## 2025-04-22 DIAGNOSIS — I10 ESSENTIAL (PRIMARY) HYPERTENSION: ICD-10-CM

## 2025-04-22 DIAGNOSIS — E11.9 TYPE 2 DIABETES MELLITUS W/OUT COMPLICATIONS: ICD-10-CM

## 2025-04-22 DIAGNOSIS — E66.01 MORBID (SEVERE) OBESITY DUE TO EXCESS CALORIES: ICD-10-CM

## 2025-04-22 DIAGNOSIS — R30.0 DYSURIA: ICD-10-CM

## 2025-04-22 DIAGNOSIS — E03.9 HYPOTHYROIDISM, UNSPECIFIED: ICD-10-CM

## 2025-04-22 DIAGNOSIS — N18.32 CHRONIC KIDNEY DISEASE, STAGE 3B: ICD-10-CM

## 2025-04-22 DIAGNOSIS — H35.9 UNSPECIFIED RETINAL DISORDER: ICD-10-CM

## 2025-04-22 PROCEDURE — 93000 ELECTROCARDIOGRAM COMPLETE: CPT | Mod: 59

## 2025-04-22 PROCEDURE — G0439: CPT

## 2025-04-22 NOTE — PHYSICAL EXAM
[Normal Sclera/Conjunctiva] : normal sclera/conjunctiva [PERRL] : pupils equal round and reactive to light [EOMI] : extraocular movements intact [No Edema] : there was no peripheral edema [Soft] : abdomen soft [Non Tender] : non-tender [Non-distended] : non-distended [No CVA Tenderness] : no CVA  tenderness [No Spinal Tenderness] : no spinal tenderness [No Joint Swelling] : no joint swelling [Grossly Normal Strength/Tone] : grossly normal strength/tone [Coordination Grossly Intact] : coordination grossly intact [No Focal Deficits] : no focal deficits [Normal Gait] : normal gait [Normal] : affect was normal and insight and judgment were intact [de-identified] : Normotensive  BMI: 50.98   [de-identified] : +Macular disease

## 2025-04-22 NOTE — HISTORY OF PRESENT ILLNESS
[FreeTextEntry1] : Annual Wellness exam [de-identified] : 69 yrs old female here for annual wellness examination  History of Morbid obesity, macular disease bilateral eyes, hypothyroidism, DMII, HTN, CRF stage 3B- generally GFR about 50, anxiety, OA bilateral feet, spurs bilateral heels, rosacea, OA left knee. Complains of occasional dysuria, burning- not constant and not severe, denies fever, hematuria, pelvic or abdominal pains PSH: Cholecystectomy SHUKRI-BSO 46 yrs old- was told Precancerous/heavy vaginal bleeding-has cervix 2 Csections Allergies: NKDA Medication: As per EHR - updated   Vitamin: Alive for women Vitamin D3 Turmeric AREDS, Vitamin C (new) Vaccines: No new vaccines- discussed with pt. SH: Nonsmoker, ETOH social, drugs-none FH: 2 sisters ()  breast cancer. GM  85 breast cancer. Mother lung cancer 70s . Father unknown Patient still complains of upper back pain, shoulder and neck pains due to very large/heavy breasts- would like breast reduction Also LBP and left knee pain due to arthritis. These are chronic conditions.  Exercise: Doing less walking-limited by knee pain mostly. Diet : Low carb diet  Preventive screening:   Breast imaging : Dr Lopez / Dr Silver Annual no call backs - mammogram/sonogram breasts- 10/2024   GYN/pelvic imaging: [facility]- stopped going 3 yrs ago- refer for followup    Bone density: Due - 3 yrs ago- refer for follow up.   CRC screening: Due 2025, last colonoscopy clear - 5 yrs ago as per patient   Ophthalmology: Every 3 mos-  stable- left eye improving and right eye no change   Cardiac:   cardiology appt : As per patient Echo, EKG - good   Labwork: Today  Need for lung cancer screening: Smoker within 15 yrs of 20 +pack yrs/over 50: neg   Dermatology: Annual no precancer    Dental:  Every 6 mos   Fall risk: No   Advance directives: not yet

## 2025-04-22 NOTE — HEALTH RISK ASSESSMENT
[No falls in past year] : Patient reported no falls in the past year [Little interest or pleasure doing things] : 1) Little interest or pleasure doing things [Feeling down, depressed, or hopeless] : 2) Feeling down, depressed, or hopeless [0] : 2) Feeling down, depressed, or hopeless: Not at all (0) [PHQ-2 Negative - No further assessment needed] : PHQ-2 Negative - No further assessment needed [Time Spent: ___ Minutes] : I spent [unfilled] minutes performing a depression screening for this patient. [Patient reported mammogram was normal] : Patient reported mammogram was normal [Fully functional (bathing, dressing, toileting, transferring, walking, feeding)] : Fully functional (bathing, dressing, toileting, transferring, walking, feeding) [Fully functional (using the telephone, shopping, preparing meals, housekeeping, doing laundry, using] : Fully functional and needs no help or supervision to perform IADLs (using the telephone, shopping, preparing meals, housekeeping, doing laundry, using transportation, managing medications and managing finances) [Reviewed no changes] : Reviewed, no changes [[id number]] : 0 [Change in mental status noted] : No change in mental status noted [Reports changes in hearing] : Reports no changes in hearing [Reports changes in vision] : Reports no changes in vision [Reports changes in dental health] : Reports no changes in dental health [MammogramDate] : 10/24 [AdvancecareDate] : 04/25 [FreeTextEntry4] : No HCP currently- discussed

## 2025-04-22 NOTE — HISTORY OF PRESENT ILLNESS
[FreeTextEntry1] : Annual Wellness exam [de-identified] : 69 yrs old female here for annual wellness examination  History of Morbid obesity, macular disease bilateral eyes, hypothyroidism, DMII, HTN, CRF stage 3B- generally GFR about 50, anxiety, OA bilateral feet, spurs bilateral heels, rosacea, OA left knee. Complains of occasional dysuria, burning- not constant and not severe, denies fever, hematuria, pelvic or abdominal pains PSH: Cholecystectomy SHUKRI-BSO 46 yrs old- was told Precancerous/heavy vaginal bleeding-has cervix 2 Csections Allergies: NKDA Medication: As per EHR - updated   Vitamin: Alive for women Vitamin D3 Turmeric AREDS, Vitamin C (new) Vaccines: No new vaccines- discussed with pt. SH: Nonsmoker, ETOH social, drugs-none FH: 2 sisters ()  breast cancer. GM  85 breast cancer. Mother lung cancer 70s . Father unknown Patient still complains of upper back pain, shoulder and neck pains due to very large/heavy breasts- would like breast reduction Also LBP and left knee pain due to arthritis. These are chronic conditions.  Exercise: Doing less walking-limited by knee pain mostly. Diet : Low carb diet  Preventive screening:   Breast imaging : Dr Lopez / Dr Silevr Annual no call backs - mammogram/sonogram breasts- 10/2024   GYN/pelvic imaging: [facility]- stopped going 3 yrs ago- refer for followup    Bone density: Due - 3 yrs ago- refer for follow up.   CRC screening: Due 2025, last colonoscopy clear - 5 yrs ago as per patient   Ophthalmology: Every 3 mos-  stable- left eye improving and right eye no change   Cardiac:   cardiology appt : As per patient Echo, EKG - good   Labwork: Today  Need for lung cancer screening: Smoker within 15 yrs of 20 +pack yrs/over 50: neg   Dermatology: Annual no precancer    Dental:  Every 6 mos   Fall risk: No   Advance directives: not yet

## 2025-04-22 NOTE — HEALTH RISK ASSESSMENT
[No falls in past year] : Patient reported no falls in the past year [Little interest or pleasure doing things] : 1) Little interest or pleasure doing things [Feeling down, depressed, or hopeless] : 2) Feeling down, depressed, or hopeless [0] : 2) Feeling down, depressed, or hopeless: Not at all (0) [PHQ-2 Negative - No further assessment needed] : PHQ-2 Negative - No further assessment needed [Time Spent: ___ Minutes] : I spent [unfilled] minutes performing a depression screening for this patient. [Patient reported mammogram was normal] : Patient reported mammogram was normal [Fully functional (bathing, dressing, toileting, transferring, walking, feeding)] : Fully functional (bathing, dressing, toileting, transferring, walking, feeding) [Fully functional (using the telephone, shopping, preparing meals, housekeeping, doing laundry, using] : Fully functional and needs no help or supervision to perform IADLs (using the telephone, shopping, preparing meals, housekeeping, doing laundry, using transportation, managing medications and managing finances) [Reviewed no changes] : Reviewed, no changes [[id number]] : 0 [Change in mental status noted] : No change in mental status noted [Reports changes in hearing] : Reports no changes in hearing [Reports changes in vision] : Reports no changes in vision [Reports changes in dental health] : Reports no changes in dental health [MammogramDate] : 10/24 [AdvancecareDate] : 04/25 [FreeTextEntry4] : No HCP currently- discussed

## 2025-04-22 NOTE — PLAN
"  The patient has been notified of following:      \"This virtual  visit will be conducted via a call between you and your physician/provider. We have found that certain health care needs can be provided without the need for a physical exam.  This service lets us provide the care you need virtually/via video   If a prescription is necessary we can send it directly to your pharmacy.  If lab work is needed we can place an order for that and you can then stop by our lab to have the test done at a later time.     Virtual/Video visits are billed at different rates depending on your insurance coverage. During this emergency period, for some insurers they may be billed the same as an in-person visit.  Please reach out to your insurance provider with any questions.          Dyllan w would you like to obtain your AVS? Mail a copy  If the video visit is dropped, the invitation should be resent by: Send to e-mail at: [email or web address]              Psychiatric  Out- Patient  Follow Up Progress Note  Date of visit:         Discussion of Care and Treatment Recommendations:   This is a 24 year old male with  Intellectual disability,   history of fetal alcohol  spectrum,and  mood Disorder, dueto general medical condition.Pt resides in a group home.       Group Home Admin (Susan, [phone]- present for visit   Mental Health : Escobar Winslow, [employer]. Phone: [phone]; Fax: [phone]; Office: [phone]. - present   ---- CADI : Patsy Odell, [facility]. Phone: [phone]; Fax: [phone].  ---- Representative Payee: Nathaniel Claros, [facility]. Phone: [phone].   ---- Care Coordination: SADE Boykin Case Manager, [facility]. Phone: [phone]         Last visit  01/10/2022.  Recommendation at last visit .  1-Continue with current medications :   Abilify  10 mg in the morning  quinicine  3 mg at bedtime   Invega 234 mg /IM monthly net dues 10/14 - given by group home "   Mfnwfljcry21 mg BID - agitation PRN   Statreta 100 mg in am   Trazodone 300 mg at bedtime   Lithium 900 mg very evening with mels   Hydroxyzine 50 mg TID PRN- Has only taken it 3 times since 9/1/2021   2-High risk medication use-lipid panel basic metabolic panel labs were completed in August 2021 and were within normal limits.  Will order Lithium level today   3.  Return to the clinic in approximately 8 weeks-come to the clinic for DISCUS assessment  Patient and I reviewed diagnosis and treatment plan and patient agrees with following recommendations:  Ongoing education given regarding diagnostic and treatment options with adequate verbalization of understanding.  Plan   1-Continue with current medications :   Abilify  10 mg in the morning  quinicine  3 mg at bedtime    Invega 234 mg /IM monthly -Last Invega Sustenna 234 mg IM was 3/02/2022 given by group home   Muzemtsxlz71 mg BID - agitation PRN   Statreta 100 mg in am   Trazodone 300 mg at bedtime   Lithium 900 mg very evening with mels   Hydroxyzine 50 mg TID PRN- Has only taken it 3 times since 9/1/2021   2-High risk medication use-lipid panel basic metabolic panel labs were completed in August 2021 and were within normal limits.  Will order Lithium level today   3.  Return to the clinic in approximately 8 weeks-come to the clinic for DISCUS assessment         DIagnoses:   Schizoaffective disorder, unspecified type (HCC)  Active Problems:  Intermittent explosive disorder  Fetal alcohol spectrum disorder  History of traumatic brain injury  Tobacco use disorder  Mild intellectual disability  ADHD (attention deficit hyperactivity disorder), combined type  Aggressive behavior      Patient Active Problem List   Diagnosis     Thrombocytopenia (H)     Chronic ITP (idiopathic thrombocytopenia) (H)             Chief Complaint / Subjective:    Chief complaint: Intermittent explosive disorder    History of Present Illness:   Per patient statement-he is taking his  "medications and denies side effects.  He is getting along with the [facility] staff.  He has no housemates at the moment.  He has been mainly staying indoors playing video games but he will try and take a walk this week since the weather is better.  He occasionally has his mother to visit him and take him out.   was also present for the interview who reports that patient is responding positively to current medications and he has not been any behavioral issues.  The staff at the [facility] have good rapport with patient.  Patient is due for a DISCUS evaluation and I did communicate with  but he needs to come to the clinic for this evaluation.  We did make an appointment already and scheduled that.  Patient will continue with current plan of care for now and return to the clinic in approximately 8 weeks.  He will call in between visits any questions or concerns.    Mental Status Examination:   Appearance: Well-groomed, poor eye contact  Orientation: Patient alert and oriented to person, place, time, and situation  Reliability:  Patient appears to be an adequate historian.    Behavior: He remained calm and compliant and answered all my questions.  Speech: Speech is spontaneous and coherent, with a normal rate, rhythm and tone.    Language:There are no difficulties with expressive or receptive language as observed throughout the interview.    Mood: Described as \"ok\".    Affect:  Congruent  Judgement: Able to make basic decision regarding safety.  Insight: Good awareness of physical and mental health conditions and aware of needs around care for these.  Gait and station: unable to assess  Thought process: Logical   Thought content: No evidence of delusions or paranoia.    Hallucinations : No evidence of any hallucination  Thought content: No evidence of delusions or paranoia.   Suicidal /Homical Ideations:  No thoughts of self harm or suicide. No thoughts of harming " "others.  Associations: Connected  Fund of knowledge: Average  Attention / Concentration: Able to remain focused during the interview with minimal distractibility or need for redirection.  Short Term Memory: Grossly intact as evidence by client recalling themes and ideas discussed.  Long Term Memory: Intact  Motor Status: unable to asse    Drug/treatment history and current pattern of use:   Drug/treatment history and current pattern of use:   History of cannabis use  History of nicotine use-vaping  Currently denies use of both cannabis and nicotine since August 2021    Medication changes: See Above   Medication adherence: compliant  Medication side effects: absent  Information about medications: Side effects, benefits and alternative treatments discussed and patient agrees .    Psychotherapy: Supportive therapy day-to-day living    Education: Diet, exercise, abstinence from drugs and alcohol, patient will not drive if sedated and medications or  under influence of any substance    Lab Results:   Personally reviewed and discussed with the patient    Lab Results   Component Value Date    WBC 12.9 (H) 10/13/2021    HGB 14.7 10/13/2021    HCT 45.4 10/13/2021     10/13/2021    CHOL 139 10/13/2021    TRIG 118 10/13/2021    HDL 38 (L) 10/13/2021    ALT 25 10/13/2021    AST 13 10/13/2021     10/13/2021    BUN 7 10/13/2021    CO2 25 10/13/2021    TSH 3.31 10/13/2021       Vital signs:  Ht 1.676 m (5' 6\")   Wt 117.9 kg (260 lb)   BMI 41.97 kg/m    Telemedicine visit-no vital signs completed  Allergies: Depakote [valproic acid] and Other environmental allergy         Medications:     Current Outpatient Medications   Medication     ARIPiprazole (ABILIFY) 10 MG tablet     atomoxetine (STRATTERA) 100 MG capsule     atropine 1 % ophthalmic solution     chlorproMAZINE (THORAZINE) 50 MG tablet     cholecalciferol 25 MCG (1000 UT) TABS     ferrous sulfate (FEROSUL) 325 (65 Fe) MG tablet     guanFACINE HCl (INTUNIV) 3 " MG TB24 24 hr tablet     hydrOXYzine (ATARAX) 50 MG tablet     lithium 300 MG capsule     loratadine (CLARITIN) 10 MG tablet     paliperidone (INVEGA SUSTENNA) 234 MG/1.5ML JUANA     traZODone HCl 300 MG TABS     vitamin C (ASCORBIC ACID) 250 MG TABS tablet     metFORMIN (GLUCOPHAGE-XR) 500 MG 24 hr tablet     No current facility-administered medications for this visit.                 Medication adherence: Reviewed risk/benefits of medication , Patient able to verbalize understanding of side effects and Patient verbally consents to taking medications           Review of Systems:      ROS:    Subjective Data Only- Tele-Health Visit    10 point ROS was negative except for the items listed in HPI.      Coordination of Care:   More than 30 minutes spent on this visit  with more than 50% of time spent on coordination of care including: Educating patient about diagnosis, prognosis, side effects and benefits of medications, diet, exercise.  Time also spent providing supportive therapy regarding above issues.      Video-Visit Details    Type of service:  Video Visit    Originating Location (pt. Location): Home    Distant Location (provider location):  [facility]     Platform used for Video Visit: [employer]      This note was created using a dictation system. All typing errors or contextual distortion is unintentional and software inherent.  Start Time : 1530  End time : 1600   [FreeTextEntry1] : 69 years old female here for annual wellness examination.  Main complaint today is dysuria intermittent -will check for urine screen. Diet and exercise discussed with patient.  Exercise is limited by OA knees.  Diet is low-carb Regarding preventive care, patient sees ophthalmologist and retinal specialist every 3 months or more for retinal disease.  Condition is stable.  Patient states left eye is improving and right eye has no change. Recommend GYN follow-up.  Last visit about 3 years ago.  History SHUKRI/BSO Bone density due now referral done. Cardiology appointment 2023 as per patient.  EKG today read as abnormal:  shows normal sinus rhythm and poor R wave progression -similar to prior EKGs- patient had full workup 2023- Echo was normal. Due back for follow up this year. Other preventive screening dermatology and dental were good No advanced directives- no change Vaccines discussed with pt- currently refuses all.  Last breast imaging 10/2024 which were normal CRC screening due this year 9/2025, prior was benign as per pt Full lab work done including CBC, CMP, lipid, TFTs, LFTs, A1c, magnesium, CK, urine screen-including UA, culture and sensitivity,, cytology, vitamin levels. Medication reconciliation done- no use of opioids or benzos currently. RV 3 months Patient understands instructions and agrees with plan Will contact patient with results of all testing.

## 2025-04-22 NOTE — PLAN
[FreeTextEntry1] : 69 years old female here for annual wellness examination.  Main complaint today is dysuria intermittent -will check for urine screen. Diet and exercise discussed with patient.  Exercise is limited by OA knees.  Diet is low-carb Regarding preventive care, patient sees ophthalmologist and retinal specialist every 3 months or more for retinal disease.  Condition is stable.  Patient states left eye is improving and right eye has no change. Recommend GYN follow-up.  Last visit about 3 years ago.  History SHUKRI/BSO Bone density due now referral done. Cardiology appointment 2023 as per patient.  EKG today read as abnormal:  shows normal sinus rhythm and poor R wave progression -similar to prior EKGs- patient had full workup 2023- Echo was normal. Due back for follow up this year. Other preventive screening dermatology and dental were good No advanced directives- no change Vaccines discussed with pt- currently refuses all.  Last breast imaging 10/2024 which were normal CRC screening due this year 9/2025, prior was benign as per pt Full lab work done including CBC, CMP, lipid, TFTs, LFTs, A1c, magnesium, CK, urine screen-including UA, culture and sensitivity,, cytology, vitamin levels. Medication reconciliation done- no use of opioids or benzos currently. RV 3 months Patient understands instructions and agrees with plan Will contact patient with results of all testing.

## 2025-04-22 NOTE — REVIEW OF SYSTEMS
[Negative] : Heme/Lymph [FreeTextEntry3] : Hx macular disease [FreeTextEntry8] : Irritation/itching vaginal

## 2025-04-22 NOTE — PHYSICAL EXAM
[Normal Sclera/Conjunctiva] : normal sclera/conjunctiva [PERRL] : pupils equal round and reactive to light [EOMI] : extraocular movements intact [No Edema] : there was no peripheral edema [Soft] : abdomen soft [Non Tender] : non-tender [Non-distended] : non-distended [No CVA Tenderness] : no CVA  tenderness [No Spinal Tenderness] : no spinal tenderness [No Joint Swelling] : no joint swelling [Grossly Normal Strength/Tone] : grossly normal strength/tone [Coordination Grossly Intact] : coordination grossly intact [No Focal Deficits] : no focal deficits [Normal Gait] : normal gait [Normal] : affect was normal and insight and judgment were intact [de-identified] : Normotensive  BMI: 50.98   [de-identified] : +Macular disease

## 2025-04-24 LAB
25(OH)D3 SERPL-MCNC: 36.3 NG/ML
ALBUMIN SERPL ELPH-MCNC: 4.7 G/DL
ALP BLD-CCNC: 134 U/L
ALT SERPL-CCNC: 13 U/L
ANION GAP SERPL CALC-SCNC: 14 MMOL/L
APPEARANCE: CLEAR
AST SERPL-CCNC: 17 U/L
BACTERIA: NEGATIVE /HPF
BASOPHILS # BLD AUTO: 0.06 K/UL
BASOPHILS NFR BLD AUTO: 0.7 %
BILIRUB SERPL-MCNC: 0.5 MG/DL
BILIRUBIN URINE: NEGATIVE
BLOOD URINE: NEGATIVE
BUN SERPL-MCNC: 20 MG/DL
CALCIUM SERPL-MCNC: 9.8 MG/DL
CAST: 0 /LPF
CHLORIDE SERPL-SCNC: 101 MMOL/L
CHOLEST SERPL-MCNC: 189 MG/DL
CK SERPL-CCNC: 41 U/L
CO2 SERPL-SCNC: 25 MMOL/L
COLOR: YELLOW
CREAT SERPL-MCNC: 1.35 MG/DL
CREAT SPEC-SCNC: 58 MG/DL
CRP SERPL-MCNC: 3 MG/L
EGFRCR SERPLBLD CKD-EPI 2021: 43 ML/MIN/1.73M2
EOSINOPHIL # BLD AUTO: 0.18 K/UL
EOSINOPHIL NFR BLD AUTO: 2.2 %
EPITHELIAL CELLS: 4 /HPF
ERYTHROCYTE [SEDIMENTATION RATE] IN BLOOD BY WESTERGREN METHOD: 23 MM/HR
ESTIMATED AVERAGE GLUCOSE: 123 MG/DL
FERRITIN SERPL-MCNC: 127 NG/ML
FOLATE SERPL-MCNC: 19.7 NG/ML
GLUCOSE QUALITATIVE U: NEGATIVE MG/DL
GLUCOSE SERPL-MCNC: 94 MG/DL
HBA1C MFR BLD HPLC: 5.9 %
HCT VFR BLD CALC: 39.9 %
HDLC SERPL-MCNC: 50 MG/DL
HGB BLD-MCNC: 12.8 G/DL
IMM GRANULOCYTES NFR BLD AUTO: 0.1 %
IRON SATN MFR SERPL: 16 %
IRON SERPL-MCNC: 58 UG/DL
KETONES URINE: NEGATIVE MG/DL
LDLC SERPL-MCNC: 103 MG/DL
LEUKOCYTE ESTERASE URINE: ABNORMAL
LYMPHOCYTES # BLD AUTO: 2.38 K/UL
LYMPHOCYTES NFR BLD AUTO: 28.5 %
MAGNESIUM SERPL-MCNC: 1.6 MG/DL
MAN DIFF?: NORMAL
MCHC RBC-ENTMCNC: 27.9 PG
MCHC RBC-ENTMCNC: 32.1 G/DL
MCV RBC AUTO: 87.1 FL
MICROALBUMIN 24H UR DL<=1MG/L-MCNC: <1.2 MG/DL
MICROALBUMIN/CREAT 24H UR-RTO: NORMAL MG/G
MICROSCOPIC-UA: NORMAL
MONOCYTES # BLD AUTO: 0.48 K/UL
MONOCYTES NFR BLD AUTO: 5.7 %
NEUTROPHILS # BLD AUTO: 5.24 K/UL
NEUTROPHILS NFR BLD AUTO: 62.8 %
NITRITE URINE: NEGATIVE
NONHDLC SERPL-MCNC: 139 MG/DL
PH URINE: 6
PLATELET # BLD AUTO: 280 K/UL
POTASSIUM SERPL-SCNC: 4.2 MMOL/L
PROT SERPL-MCNC: 7.4 G/DL
PROTEIN URINE: NEGATIVE MG/DL
RBC # BLD: 4.58 M/UL
RBC # FLD: 13.4 %
RED BLOOD CELLS URINE: 0 /HPF
SODIUM SERPL-SCNC: 140 MMOL/L
SPECIFIC GRAVITY URINE: 1.01
T4 FREE SERPL-MCNC: 1.5 NG/DL
TIBC SERPL-MCNC: 364 UG/DL
TRIGL SERPL-MCNC: 209 MG/DL
TSH SERPL-ACNC: 2.12 UIU/ML
UIBC SERPL-MCNC: 305 UG/DL
URINE CYTOLOGY: NORMAL
UROBILINOGEN URINE: 0.2 MG/DL
VIT B12 SERPL-MCNC: 925 PG/ML
WBC # FLD AUTO: 8.35 K/UL
WHITE BLOOD CELLS URINE: 2 /HPF

## 2025-05-03 ENCOUNTER — RX RENEWAL (OUTPATIENT)
Age: 70
End: 2025-05-03

## 2025-05-15 ENCOUNTER — RX RENEWAL (OUTPATIENT)
Age: 70
End: 2025-05-15

## 2025-05-22 ENCOUNTER — APPOINTMENT (OUTPATIENT)
Dept: RADIOLOGY | Facility: CLINIC | Age: 70
End: 2025-05-22

## 2025-05-22 ENCOUNTER — NON-APPOINTMENT (OUTPATIENT)
Age: 70
End: 2025-05-22

## 2025-05-22 ENCOUNTER — OUTPATIENT (OUTPATIENT)
Dept: OUTPATIENT SERVICES | Facility: HOSPITAL | Age: 70
LOS: 1 days | End: 2025-05-22
Payer: MEDICARE

## 2025-05-22 ENCOUNTER — APPOINTMENT (OUTPATIENT)
Dept: ULTRASOUND IMAGING | Facility: CLINIC | Age: 70
End: 2025-05-22

## 2025-05-22 DIAGNOSIS — Z00.00 ENCOUNTER FOR GENERAL ADULT MEDICAL EXAMINATION WITHOUT ABNORMAL FINDINGS: ICD-10-CM

## 2025-05-22 DIAGNOSIS — N18.32 CHRONIC KIDNEY DISEASE, STAGE 3B: ICD-10-CM

## 2025-05-22 DIAGNOSIS — E66.01 MORBID (SEVERE) OBESITY DUE TO EXCESS CALORIES: ICD-10-CM

## 2025-05-22 DIAGNOSIS — E03.9 HYPOTHYROIDISM, UNSPECIFIED: ICD-10-CM

## 2025-05-22 DIAGNOSIS — E11.9 TYPE 2 DIABETES MELLITUS WITHOUT COMPLICATIONS: ICD-10-CM

## 2025-05-22 PROCEDURE — 93975 VASCULAR STUDY: CPT

## 2025-05-22 PROCEDURE — 93975 VASCULAR STUDY: CPT | Mod: 26

## 2025-05-22 PROCEDURE — 77085 DXA BONE DENSITY AXL VRT FX: CPT | Mod: 26

## 2025-05-22 PROCEDURE — 77085 DXA BONE DENSITY AXL VRT FX: CPT

## 2025-06-02 ENCOUNTER — APPOINTMENT (OUTPATIENT)
Dept: NEPHROLOGY | Facility: CLINIC | Age: 70
End: 2025-06-02

## 2025-06-02 VITALS
TEMPERATURE: 97.4 F | DIASTOLIC BLOOD PRESSURE: 62 MMHG | OXYGEN SATURATION: 96 % | BODY MASS INDEX: 47.29 KG/M2 | HEIGHT: 64 IN | HEART RATE: 73 BPM | SYSTOLIC BLOOD PRESSURE: 104 MMHG | WEIGHT: 277 LBS

## 2025-06-02 DIAGNOSIS — E03.9 HYPOTHYROIDISM, UNSPECIFIED: ICD-10-CM

## 2025-06-02 DIAGNOSIS — E11.9 TYPE 2 DIABETES MELLITUS W/OUT COMPLICATIONS: ICD-10-CM

## 2025-06-02 DIAGNOSIS — E66.01 MORBID (SEVERE) OBESITY DUE TO EXCESS CALORIES: ICD-10-CM

## 2025-06-02 PROCEDURE — 99204 OFFICE O/P NEW MOD 45 MIN: CPT

## 2025-06-02 NOTE — PHYSICAL EXAM
[General Appearance - Alert] : alert [General Appearance - In No Acute Distress] : in no acute distress [Sclera] : the sclera and conjunctiva were normal [PERRL With Normal Accommodation] : pupils were equal in size, round, and reactive to light [Extraocular Movements] : extraocular movements were intact [Outer Ear] : the ears and nose were normal in appearance [Oropharynx] : the oropharynx was normal [Neck Appearance] : the appearance of the neck was normal [Neck Cervical Mass (___cm)] : no neck mass was observed [Jugular Venous Distention Increased] : there was no jugular-venous distention [Thyroid Diffuse Enlargement] : the thyroid was not enlarged [Thyroid Nodule] : there were no palpable thyroid nodules [Auscultation Breath Sounds / Voice Sounds] : lungs were clear to auscultation bilaterally [Heart Rate And Rhythm] : heart rate was normal and rhythm regular [Heart Sounds] : normal S1 and S2 [Heart Sounds Gallop] : no gallops [Murmurs] : no murmurs [Heart Sounds Pericardial Friction Rub] : no pericardial rub [Full Pulse] : the pedal pulses are present [Edema] : there was no peripheral edema [Bowel Sounds] : normal bowel sounds [Abdomen Soft] : soft [Abdomen Tenderness] : non-tender [Abdomen Mass (___ Cm)] : no abdominal mass palpated [Skin Color & Pigmentation] : normal skin color and pigmentation [Skin Turgor] : normal skin turgor [] : no rash

## 2025-06-09 LAB
DSDNA AB SER-ACNC: 1 IU/ML
GGT SERPL-CCNC: 14 U/L
INSULIN P FAST SERPL-ACNC: 49.2 UU/ML

## 2025-06-10 LAB
ANA PAT FLD IF-IMP: NORMAL
ANA SER IF-ACNC: ABNORMAL

## 2025-06-12 LAB
ALP BLD-CCNC: 116 IU/L
ALP BONE CFR SERPL: 19 %
ALP INTEST CFR SERPL: 3 %
ALP LIVER CFR SERPL: 78 %

## 2025-07-01 ENCOUNTER — APPOINTMENT (OUTPATIENT)
Dept: INTERNAL MEDICINE | Facility: CLINIC | Age: 70
End: 2025-07-01
Payer: MEDICARE

## 2025-07-01 VITALS
TEMPERATURE: 98 F | SYSTOLIC BLOOD PRESSURE: 118 MMHG | HEART RATE: 79 BPM | OXYGEN SATURATION: 97 % | DIASTOLIC BLOOD PRESSURE: 71 MMHG

## 2025-07-01 PROBLEM — R21 RASH: Status: ACTIVE | Noted: 2025-07-01

## 2025-07-01 PROBLEM — M79.89 BILATERAL SWELLING OF FEET: Status: ACTIVE | Noted: 2025-07-01

## 2025-07-01 PROCEDURE — 99213 OFFICE O/P EST LOW 20 MIN: CPT

## 2025-07-01 PROCEDURE — G2211 COMPLEX E/M VISIT ADD ON: CPT

## 2025-07-01 RX ORDER — SEMAGLUTIDE 1.34 MG/ML
4 INJECTION, SOLUTION SUBCUTANEOUS
Qty: 3 | Refills: 3 | Status: ACTIVE | COMMUNITY
Start: 2025-07-01 | End: 1900-01-01

## 2025-07-03 ENCOUNTER — NON-APPOINTMENT (OUTPATIENT)
Age: 70
End: 2025-07-03

## 2025-07-03 PROBLEM — M79.673 FOOT PAIN: Status: ACTIVE | Noted: 2025-07-03

## 2025-07-03 RX ORDER — BLOOD-GLUCOSE SENSOR
EACH MISCELLANEOUS
Qty: 6 | Refills: 3 | Status: ACTIVE | COMMUNITY
Start: 2025-07-03 | End: 1900-01-01

## 2025-07-03 RX ORDER — BLOOD-GLUCOSE,RECEIVER,CONT
EACH MISCELLANEOUS
Qty: 1 | Refills: 0 | Status: ACTIVE | COMMUNITY
Start: 2025-07-03 | End: 1900-01-01

## 2025-07-03 NOTE — PHYSICAL EXAM
[Pedal Pulses Present] : the pedal pulses are present [No Edema] : there was no peripheral edema [Normal] : no jugular venous distention, supple, no lymphadenopathy and the thyroid was normal and there were no nodules present [de-identified] : Normotensive  Afebrile  Refuses weight [de-identified] : Bilateral feet- no redness,cellulitis, ulcers.  Color normal bilaterally  No joint pains, FROM  No current pain.

## 2025-07-03 NOTE — HISTORY OF PRESENT ILLNESS
[FreeTextEntry8] : 69 yrs old female with history of morbid obesity, macular disease bilateral eyes, hypothyroidism, DMII, HTN, CRF stage 3B- generally GFR 40s, anxiety, OA bilateral feet, spurs bilateral heels, rosacea, OA left knee here for acute visit. Patient complains of 1-2 days of severe pain top of feet more on the right foot with mild swelling which has subsided.  Denies redness or joint pain/stiffness in toes. No fever, chills, denies known history of gout. Generally, no leg or feet pain when walking.  Patient states today pain is much less in both feet.  No leg pains or swelling. Hx OA feet and spurs in heels. PSH: Cholecystectomy SHUKRI-BSO 46 yrs old- was told Precancerous/heavy vaginal bleeding-has cervix 2 Csections Allergies: NKDA Medication: As per EHR - no medication changes-reconciliation done- med renewals requested Requesting increase in Ozempic- currently on 0.5 mg weekly Vitamin: Alive for women Vitamin D3 Turmeric AREDS, Vitamin C Vaccines: No new vaccines SH: Nonsmoker, ETOH social, drugs-none FH: 2 sisters ()  breast cancer. GM  85 breast cancer. Mother lung cancer 70s . Father unknown Diet: Diabetic Exercise: Less walking Patient also seeing new nephrologist this month

## 2025-07-03 NOTE — PLAN
[FreeTextEntry1] : 69 years old female here for urgent visit.  Patient has had severe pain in her upper feet, worse on the right for the past 1 to 2 days with some mild swelling.  There was no joint pains in toes, no redness, or stiffness.  Bilateral feet and legs are currently asymptomatic-no edema, cellulitis appreciated.  Toes are normal without pain, stiffness or redness. Likely OA flare but can also be gout. Should not take NSAIDs due to CRF3B Will consider short course of steroids if pain returns Uric acid ordered to rule out gout.  Medication reconciliation done.  There is no new medication changes.  Medication renewed-recent lab work 4/2025 showed normal thyroid function on current dosage of meds-Synthroid renewed. Olmesartan renewed-blood pressure today normal Increase Ozempic to 1 mg weekly- SQ- discussed with patient at length Monitor glucoses closely to avoid low glucoses  Parameters discussed- stop if glucoses go under 80 or feel any symptoms of hypoglycemia Patient started seeing a new nephrologist for CRF 3B and further testing has been ordered. Patient advised to drink a lot of water daily. Diet and exercise discussed-counseling done-patient is on diabetic diet, and last A1c was 5.9.  Exercise is walking. RV 3 mos Patient understands instructions and agrees with plan Will contact patient with results of all testing.

## 2025-07-03 NOTE — REVIEW OF SYSTEMS
[Negative] : Heme/Lymph [FreeTextEntry3] : See ophthamlology for MD [FreeTextEntry9] : Pain in feet as per HPI

## 2025-07-03 NOTE — PHYSICAL EXAM
[Pedal Pulses Present] : the pedal pulses are present [No Edema] : there was no peripheral edema [Normal] : no jugular venous distention, supple, no lymphadenopathy and the thyroid was normal and there were no nodules present [de-identified] : Normotensive  Afebrile  Refuses weight [de-identified] : Bilateral feet- no redness,cellulitis, ulcers.  Color normal bilaterally  No joint pains, FROM  No current pain.

## 2025-07-07 LAB — URATE SERPL-MCNC: 7.3 MG/DL

## 2025-07-08 PROBLEM — M10.9 GOUT: Status: ACTIVE | Noted: 2025-07-08

## 2025-07-14 ENCOUNTER — APPOINTMENT (OUTPATIENT)
Dept: NEPHROLOGY | Facility: CLINIC | Age: 70
End: 2025-07-14
Payer: MEDICARE

## 2025-07-14 VITALS
SYSTOLIC BLOOD PRESSURE: 112 MMHG | OXYGEN SATURATION: 97 % | WEIGHT: 278 LBS | TEMPERATURE: 97.2 F | HEIGHT: 64 IN | BODY MASS INDEX: 47.46 KG/M2 | DIASTOLIC BLOOD PRESSURE: 60 MMHG | HEART RATE: 68 BPM

## 2025-07-14 PROBLEM — R76.8 ELEVATED ANTINUCLEAR ANTIBODY (ANA) LEVEL: Status: ACTIVE | Noted: 2025-07-14

## 2025-07-14 PROCEDURE — 99213 OFFICE O/P EST LOW 20 MIN: CPT

## 2025-07-14 PROCEDURE — G2211 COMPLEX E/M VISIT ADD ON: CPT

## 2025-07-14 NOTE — PHYSICAL EXAM
[General Appearance - Alert] : alert [General Appearance - In No Acute Distress] : in no acute distress [Sclera] : the sclera and conjunctiva were normal [PERRL With Normal Accommodation] : pupils were equal in size, round, and reactive to light [Extraocular Movements] : extraocular movements were intact [Outer Ear] : the ears and nose were normal in appearance [Oropharynx] : the oropharynx was normal [Neck Appearance] : the appearance of the neck was normal [Neck Cervical Mass (___cm)] : no neck mass was observed [Jugular Venous Distention Increased] : there was no jugular-venous distention [Thyroid Diffuse Enlargement] : the thyroid was not enlarged [Thyroid Nodule] : there were no palpable thyroid nodules [Auscultation Breath Sounds / Voice Sounds] : lungs were clear to auscultation bilaterally [Heart Rate And Rhythm] : heart rate was normal and rhythm regular [Heart Sounds] : normal S1 and S2 [Heart Sounds Gallop] : no gallops [Murmurs] : no murmurs [Heart Sounds Pericardial Friction Rub] : no pericardial rub [Full Pulse] : the pedal pulses are present [Edema] : there was no peripheral edema [Bowel Sounds] : normal bowel sounds [Abdomen Soft] : soft [Abdomen Tenderness] : non-tender [] : no hepato-splenomegaly [Abdomen Mass (___ Cm)] : no abdominal mass palpated [Abnormal Walk] : normal gait [Nail Clubbing] : no clubbing  or cyanosis of the fingernails [Musculoskeletal - Swelling] : no joint swelling seen [Motor Tone] : muscle strength and tone were normal

## 2025-07-14 NOTE — HISTORY OF PRESENT ILLNESS
[FreeTextEntry1] : Assessment Diabetes (250.00) (E11.9) Hypothyroidism (244.9) (E03.9) Morbid obesity (278.01) (E66.01)  7/14 Here for F/U Gained 1 lb will start Ozempic higher dose

## 2025-07-14 NOTE — ASSESSMENT
[FreeTextEntry1] : Assessment Diabetes (250.00) (E11.9) Hypothyroidism (244.9) (E03.9) Morbid obesity (278.01) (E66.01) Insulin Resistance  7/14 CKD III stable DM  elevated insulin levels on ozempic Insulin resistance? with fasting glucose of 49  she has elevated glucose levels Needs carb restriction, weight loss  elevated COMPA will refer to RHEUM , may have GOUT as well

## 2025-07-29 ENCOUNTER — NON-APPOINTMENT (OUTPATIENT)
Age: 70
End: 2025-07-29

## 2025-07-29 RX ORDER — SEMAGLUTIDE 0.68 MG/ML
2 INJECTION, SOLUTION SUBCUTANEOUS
Qty: 1 | Refills: 3 | Status: ACTIVE | COMMUNITY
Start: 2025-07-29 | End: 1900-01-01

## 2025-09-02 ENCOUNTER — APPOINTMENT (OUTPATIENT)
Dept: INTERNAL MEDICINE | Facility: CLINIC | Age: 70
End: 2025-09-02
Payer: MEDICARE

## 2025-09-02 VITALS
OXYGEN SATURATION: 97 % | BODY MASS INDEX: 46.61 KG/M2 | WEIGHT: 273 LBS | HEART RATE: 80 BPM | HEIGHT: 64 IN | SYSTOLIC BLOOD PRESSURE: 112 MMHG | DIASTOLIC BLOOD PRESSURE: 73 MMHG

## 2025-09-02 DIAGNOSIS — M79.673 PAIN IN UNSPECIFIED FOOT: ICD-10-CM

## 2025-09-02 DIAGNOSIS — E11.9 TYPE 2 DIABETES MELLITUS W/OUT COMPLICATIONS: ICD-10-CM

## 2025-09-02 DIAGNOSIS — I10 ESSENTIAL (PRIMARY) HYPERTENSION: ICD-10-CM

## 2025-09-02 DIAGNOSIS — M79.89 OTHER SPECIFIED SOFT TISSUE DISORDERS: ICD-10-CM

## 2025-09-02 DIAGNOSIS — N18.32 CHRONIC KIDNEY DISEASE, STAGE 3B: ICD-10-CM

## 2025-09-02 DIAGNOSIS — M10.9 GOUT, UNSPECIFIED: ICD-10-CM

## 2025-09-02 DIAGNOSIS — E66.01 MORBID (SEVERE) OBESITY DUE TO EXCESS CALORIES: ICD-10-CM

## 2025-09-02 PROCEDURE — G2211 COMPLEX E/M VISIT ADD ON: CPT

## 2025-09-02 PROCEDURE — 99214 OFFICE O/P EST MOD 30 MIN: CPT

## 2025-09-16 ENCOUNTER — NON-APPOINTMENT (OUTPATIENT)
Age: 70
End: 2025-09-16

## 2025-09-22 PROBLEM — N76.0 ACUTE VAGINITIS: Status: ACTIVE | Noted: 2025-09-22

## 2025-09-22 PROBLEM — Z01.419 ENCOUNTER FOR ANNUAL ROUTINE GYNECOLOGICAL EXAMINATION: Status: ACTIVE | Noted: 2025-09-22

## 2025-09-23 PROBLEM — N95.2 ATROPHIC VAGINITIS: Status: ACTIVE | Noted: 2025-09-23

## 2025-09-23 PROBLEM — Z12.39 BREAST CANCER SCREENING: Status: ACTIVE | Noted: 2025-09-23

## 2025-09-23 PROBLEM — R92.30 DENSE BREAST: Status: ACTIVE | Noted: 2025-09-23
